# Patient Record
Sex: FEMALE | Race: WHITE | Employment: FULL TIME | URBAN - METROPOLITAN AREA
[De-identification: names, ages, dates, MRNs, and addresses within clinical notes are randomized per-mention and may not be internally consistent; named-entity substitution may affect disease eponyms.]

---

## 2018-01-02 PROBLEM — F41.8 DEPRESSION WITH ANXIETY: Status: ACTIVE | Noted: 2018-01-02

## 2018-02-28 PROBLEM — E03.9 PRIMARY HYPOTHYROIDISM: Status: ACTIVE | Noted: 2018-02-28

## 2018-09-06 ENCOUNTER — HOSPITAL ENCOUNTER (OUTPATIENT)
Dept: GENERAL RADIOLOGY | Age: 53
Discharge: HOME | End: 2018-09-06
Payer: COMMERCIAL

## 2018-09-06 DIAGNOSIS — R05.9 COUGH: ICD-10-CM

## 2018-09-06 PROCEDURE — 71046 X-RAY EXAM CHEST 2 VIEWS: CPT

## 2018-09-21 ENCOUNTER — RX ONLY (OUTPATIENT)
Age: 53
Setting detail: RX ONLY
End: 2018-09-21

## 2018-10-15 PROBLEM — R05.9 COUGH: Status: RESOLVED | Noted: 2018-09-06 | Resolved: 2018-10-15

## 2018-11-02 ENCOUNTER — RX ONLY (OUTPATIENT)
Age: 53
Setting detail: RX ONLY
End: 2018-11-02

## 2018-11-26 ENCOUNTER — RX ONLY (OUTPATIENT)
Age: 53
Setting detail: RX ONLY
End: 2018-11-26

## 2019-05-29 ENCOUNTER — APPOINTMENT (RX ONLY)
Dept: URBAN - METROPOLITAN AREA CLINIC 349 | Facility: CLINIC | Age: 54
Setting detail: DERMATOLOGY
End: 2019-05-29

## 2019-05-29 DIAGNOSIS — Z71.89 OTHER SPECIFIED COUNSELING: ICD-10-CM

## 2019-05-29 DIAGNOSIS — L72.0 EPIDERMAL CYST: ICD-10-CM

## 2019-05-29 DIAGNOSIS — L30.9 DERMATITIS, UNSPECIFIED: ICD-10-CM

## 2019-05-29 DIAGNOSIS — L82.1 OTHER SEBORRHEIC KERATOSIS: ICD-10-CM

## 2019-05-29 DIAGNOSIS — L84 CORNS AND CALLOSITIES: ICD-10-CM

## 2019-05-29 DIAGNOSIS — L82.0 INFLAMED SEBORRHEIC KERATOSIS: ICD-10-CM

## 2019-05-29 DIAGNOSIS — D22 MELANOCYTIC NEVI: ICD-10-CM

## 2019-05-29 PROBLEM — J30.1 ALLERGIC RHINITIS DUE TO POLLEN: Status: ACTIVE | Noted: 2019-05-29

## 2019-05-29 PROBLEM — D22.39 MELANOCYTIC NEVI OF OTHER PARTS OF FACE: Status: ACTIVE | Noted: 2019-05-29

## 2019-05-29 PROBLEM — D22.5 MELANOCYTIC NEVI OF TRUNK: Status: ACTIVE | Noted: 2019-05-29

## 2019-05-29 PROBLEM — D22.62 MELANOCYTIC NEVI OF LEFT UPPER LIMB, INCLUDING SHOULDER: Status: ACTIVE | Noted: 2019-05-29

## 2019-05-29 PROBLEM — E78.1 HYPERTRIGLYCERIDEMIA: Status: ACTIVE | Noted: 2019-05-29

## 2019-05-29 PROCEDURE — ? LIQUID NITROGEN

## 2019-05-29 PROCEDURE — ? COUNSELING

## 2019-05-29 PROCEDURE — 17110 DESTRUCTION B9 LES UP TO 14: CPT

## 2019-05-29 PROCEDURE — ? OTHER

## 2019-05-29 PROCEDURE — ? OBSERVATION

## 2019-05-29 PROCEDURE — 99242 OFF/OP CONSLTJ NEW/EST SF 20: CPT | Mod: 25

## 2019-05-29 ASSESSMENT — LOCATION SIMPLE DESCRIPTION DERM
LOCATION SIMPLE: LEFT ANTERIOR NECK
LOCATION SIMPLE: CHEST
LOCATION SIMPLE: RIGHT BUTTOCK
LOCATION SIMPLE: ABDOMEN
LOCATION SIMPLE: RIGHT FOREHEAD
LOCATION SIMPLE: LEFT FOREARM
LOCATION SIMPLE: LEFT UPPER BACK
LOCATION SIMPLE: LEFT HAND
LOCATION SIMPLE: LEFT LOWER BACK
LOCATION SIMPLE: RIGHT CHEEK
LOCATION SIMPLE: RIGHT 2ND TOE
LOCATION SIMPLE: SUPERIOR FOREHEAD
LOCATION SIMPLE: RIGHT INFERIOR EYELID

## 2019-05-29 ASSESSMENT — LOCATION DETAILED DESCRIPTION DERM
LOCATION DETAILED: RIGHT SUPERIOR MEDIAL MALAR CHEEK
LOCATION DETAILED: RIGHT LATERAL INFERIOR EYELID
LOCATION DETAILED: SUPERIOR MID FOREHEAD
LOCATION DETAILED: LEFT CLAVICULAR NECK
LOCATION DETAILED: LEFT SUPERIOR LATERAL UPPER BACK
LOCATION DETAILED: LEFT VENTRAL PROXIMAL FOREARM
LOCATION DETAILED: RIGHT SUPERIOR FOREHEAD
LOCATION DETAILED: LEFT SUPERIOR MEDIAL MIDBACK
LOCATION DETAILED: LEFT RADIAL DORSAL HAND
LOCATION DETAILED: RIGHT DORSAL 2ND TOE
LOCATION DETAILED: SUBXIPHOID
LOCATION DETAILED: LEFT LATERAL SUPERIOR CHEST
LOCATION DETAILED: RIGHT INFERIOR MEDIAL MALAR CHEEK
LOCATION DETAILED: RIGHT BUTTOCK

## 2019-05-29 ASSESSMENT — LOCATION ZONE DERM
LOCATION ZONE: NECK
LOCATION ZONE: TOE
LOCATION ZONE: FACE
LOCATION ZONE: TRUNK
LOCATION ZONE: HAND
LOCATION ZONE: ARM
LOCATION ZONE: EYELID

## 2019-05-29 NOTE — HPI: SKIN LESIONS
How Severe Is Your Skin Lesion?: mild
Have Your Skin Lesions Been Treated?: not been treated
Is This A New Presentation, Or A Follow-Up?: Skin Lesions
Additional History: Pt is here to have a skin check done, pt denies any personal or family history of melanoma or any other skin cancers. Pt also stated she has some growths that she thinks are warts that she would like to have looked at and treated if possible.

## 2019-05-29 NOTE — PROCEDURE: OTHER
Other (Free Text): Suggested pt buy mediplast and apply to the area daily and use an [place] board to remove dead skin.\\n\\nHas lesion on other foot that’s similiar. Other (Free Text): Suggested pt buy mediplast and apply to the area daily and use an [place] board to remove dead skin.\\n\\nHas lesion on other foot that’s similiar.

## 2019-05-29 NOTE — PROCEDURE: OTHER
Other (Free Text): Stated to pt that some of the larger ones might take more than one freeze. Pt expressed understanding

## 2019-05-29 NOTE — PROCEDURE: OTHER
Other (Free Text): Stated to pt that they can be extracted if they are bothersome. Declines treatment today. Quoted $20 to treat all at next appointment if she’d like

## 2019-05-29 NOTE — PROCEDURE: OTHER
Other (Free Text): Stated those can be treated with LN2. Mentioned to pt that they sometimes look worse better they get better and there is always a risk of scarring or discoloration. Pt expressed understanding.

## 2019-05-29 NOTE — PROCEDURE: OTHER
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 \\n\\nNo rash was present today and advised pt to contact office if rash recurs and we will bring her in and do a biopsy. Pt expressed understanding.

## 2019-05-29 NOTE — PROCEDURE: LIQUID NITROGEN
Render Note In Bullet Format When Appropriate: No
Duration Of Freeze Thaw-Cycle (Seconds): 3
Medical Necessity Clause: This procedure was medically necessary because the lesions that were treated were:
Consent: The patient's consent was obtained including but not limited to risks of crusting, scabbing, blistering, scarring, darker or lighter pigmentary change, recurrence, incomplete removal and infection.
Post-Care Instructions: I reviewed with the patient in detail post-care instructions. Patient is to wear sunprotection, and avoid picking at any of the treated lesions. Pt may apply Vaseline to crusted or scabbing areas.
Medical Necessity Information: It is in your best interest to select a reason for this procedure from the list below. All of these items fulfill various CMS LCD requirements except the new and changing color options.
Detail Level: Detailed

## 2019-07-02 ENCOUNTER — APPOINTMENT (RX ONLY)
Dept: URBAN - METROPOLITAN AREA CLINIC 349 | Facility: CLINIC | Age: 54
Setting detail: DERMATOLOGY
End: 2019-07-02

## 2019-07-02 DIAGNOSIS — L98.8 OTHER SPECIFIED DISORDERS OF THE SKIN AND SUBCUTANEOUS TISSUE: ICD-10-CM

## 2019-07-02 DIAGNOSIS — L82.0 INFLAMED SEBORRHEIC KERATOSIS: ICD-10-CM | Status: RESOLVED

## 2019-07-02 PROCEDURE — ? COUNSELING

## 2019-07-02 PROCEDURE — 99213 OFFICE O/P EST LOW 20 MIN: CPT

## 2019-07-02 PROCEDURE — ? MEDICAL CONSULTATION: DYNAMIC RHYTIDES

## 2019-07-02 PROCEDURE — ? OTHER

## 2019-07-02 ASSESSMENT — LOCATION ZONE DERM: LOCATION ZONE: FACE

## 2019-07-02 ASSESSMENT — LOCATION DETAILED DESCRIPTION DERM
LOCATION DETAILED: SUPERIOR MID FOREHEAD
LOCATION DETAILED: RIGHT MEDIAL FOREHEAD

## 2019-07-02 ASSESSMENT — LOCATION SIMPLE DESCRIPTION DERM
LOCATION SIMPLE: RIGHT FOREHEAD
LOCATION SIMPLE: SUPERIOR FOREHEAD

## 2019-07-02 NOTE — PROCEDURE: OTHER
Detail Level: Zone
Other (Free Text): Pt had a couple of lesions treated with LN2. Pt stated she was surprised by the discoloration and the scar it left behind. Pt is unsure if the one on her back has resolved. \\n\\nLesion on patient’s back was observed and appears to have resolved. Indy discussed with pt that it has only been six weeks since her SKs were treated. Advised pt to keep areas covered with sunscreen when out in the sun and they the discoloration should fade with time. Pt expressed understanding.
Detail Level: Detailed
Other (Free Text): Pt inquired about what treatment options are out there to help with fine lines and helping with wrinkles. Pt had pointed out the lines in her forehead and around her eyes. Indy stated she could always try RetinA to help try to smooth those lines but to really achieve the look that pt desires, she would need Botox. Indy stated those lines in her forehead and around her eyes are from movement. Pt expressed understanding.
Note Text (......Xxx Chief Complaint.): This diagnosis correlates with the

## 2022-03-19 PROBLEM — E78.1 HYPERTRIGLYCERIDEMIA: Status: ACTIVE | Noted: 2019-05-29

## 2022-03-19 PROBLEM — E03.9 PRIMARY HYPOTHYROIDISM: Status: ACTIVE | Noted: 2018-02-28

## 2022-03-20 PROBLEM — F41.8 DEPRESSION WITH ANXIETY: Status: ACTIVE | Noted: 2018-01-02

## 2022-05-26 DIAGNOSIS — E21.0 PRIMARY HYPERPARATHYROIDISM (HCC): ICD-10-CM

## 2022-05-26 DIAGNOSIS — E03.9 PRIMARY HYPOTHYROIDISM: Primary | ICD-10-CM

## 2022-05-26 DIAGNOSIS — E55.9 VITAMIN D DEFICIENCY: ICD-10-CM

## 2022-06-08 ENCOUNTER — OFFICE VISIT (OUTPATIENT)
Dept: ENDOCRINOLOGY | Age: 57
End: 2022-06-08
Payer: COMMERCIAL

## 2022-06-08 VITALS
OXYGEN SATURATION: 97 % | DIASTOLIC BLOOD PRESSURE: 86 MMHG | WEIGHT: 136 LBS | SYSTOLIC BLOOD PRESSURE: 124 MMHG | HEART RATE: 83 BPM

## 2022-06-08 DIAGNOSIS — E03.9 PRIMARY HYPOTHYROIDISM: Primary | ICD-10-CM

## 2022-06-08 DIAGNOSIS — E55.9 VITAMIN D DEFICIENCY: ICD-10-CM

## 2022-06-08 DIAGNOSIS — Z86.39 HISTORY OF PRIMARY HYPERPARATHYROIDISM: ICD-10-CM

## 2022-06-08 PROCEDURE — 99214 OFFICE O/P EST MOD 30 MIN: CPT | Performed by: INTERNAL MEDICINE

## 2022-06-08 RX ORDER — METFORMIN HYDROCHLORIDE 500 MG/1
500 TABLET, EXTENDED RELEASE ORAL DAILY
Qty: 90 TABLET | Refills: 3 | Status: SHIPPED | OUTPATIENT
Start: 2022-06-08

## 2022-06-08 RX ORDER — LEVOTHYROXINE SODIUM 0.07 MG/1
TABLET ORAL
Qty: 77 TABLET | Refills: 3 | Status: SHIPPED | OUTPATIENT
Start: 2022-06-08

## 2022-06-08 RX ORDER — LEVOTHYROXINE SODIUM 0.07 MG/1
TABLET ORAL
Qty: 77 TABLET | Refills: 3 | Status: SHIPPED | OUTPATIENT
Start: 2022-06-08 | End: 2022-06-08 | Stop reason: SDUPTHER

## 2022-06-08 ASSESSMENT — ENCOUNTER SYMPTOMS
DIARRHEA: 0
CONSTIPATION: 1

## 2022-06-08 NOTE — PROGRESS NOTES
Emily Durand MD, [address]            Reason for visit: Follow-up of hypothyroidism      ASSESSMENT AND PLAN:    1. Primary hypothyroidism  She is biochemically euthyroid. Continue levothyroxine as prescribed. - TSH; Future  - T4, Free; Future  - levothyroxine (SYNTHROID) 75 MCG tablet; 1 tablet daily 6 days a week (skip one day per week)  Dispense: 77 tablet; Refill: 3    2. Vitamin D deficiency  Vitamin D is replete. Continue over-the-counter vitamin D replacement. - Vitamin D 25 Hydroxy; Future    3. History of primary hyperparathyroidism  Calcium remains normal.  - Comprehensive Metabolic Panel; Future        Follow-up and Dispositions    · Return in about 6 months (around 12/8/2022). History of Present Illness:    Marissa Abdi presents to the office for follow up of primary hypothyroidism and primary hyperparathyroidism (status post parathyroidectomy in 2014). HYPOTHYROIDISM  Presentation of hypothyroidism: She was diagnosed with hypothyroidism in 2009. She was previously under the care of Dr. Marino Friend (endocrinologist) in [place]. Treatment status: She was started on Synthroid at the time of diagnosis. She was changed to [place] Thyroid ~2010. Her dose was decreased from 90 mg daily to 60 mg daily early 2012. Her dose was decreased further to 60 mg daily six days per week 8/30/2012. It was increased back to 60 mg every day 11/30/2012. I changed her to Synthroid 100 mcg daily 5/24/2013. On 8/10/2013, her insurance company required her to change to generic levothyroxine 100 mcg.  Her dose was changed to 100 mcg daily 6 days per week 8/27/2013, to 75 mcg daily 10/15/2013, to 75 mcg daily six days per week 10/21/2014, and to 75 mcg daily seven days per week 2/24/2017.   -75 mcg daily 6 days/week and 37.5 mcg daily 1 day/week 6/5/2019  -75 mcg daily ~July 2019 (change made on her own)  -75 mcg daily 6 days/week 11/18/2022    Symptoms: See review of systems below.     Menses: LMP was ~2014. Labs:  4/6/2012: TSH 0.74.   8/28/2012: TSH 0.270, free T4 0.8, T3 159, antithyroid peroxidase antibodies less than 10 (negative). 11/27/2012: TSH 4.930, free T4 0.56.   5/20/2013: TSH 0.725, free T4 0.67, T3 150.  8/22/2013: TSH 0.029.  10/10/2013: TSH 0.063.   12/24/2013: TSH 0.553.  4/17/2014: TSH 0.624.  10/15/2014: TSH 0.176.  2/20/2015: TSH 2.004.   8/20/2015: TSH 0.968.  2/22/2016: TSH 0.275.  6/22/2016: TSH 1.234.  2/15/2017:  TSH 3.230.  5/24/2017: TSH 0.423, free T4 1.37.  8/4/2017: TSH 1.632.  8/23/2017: TSH 0.692, free T4 1.15.  2/26/2018:  TSH 0.650, free T4 1.24.  12/28/2018: TSH 1.660, free T4 1.17, total cholesterol 243, triglycerides 166, HDL 62, , glucose 76.  6/4/2019: TSH 0.188, free T4 1.51.  12/12/2019: TSH 0.760, free T4 1.04.  6/1/2020: TSH 0.476, free T4 1.03.  8/19/2020: TSH 0.685.  5/25/2021: TSH 0.578, free T4 1.2.  11/12/2021: TSH 0.212, free T4 0.94.  6/1/2022: TSH 0.529, free T4 0.91.       HYPERCALCEMIA   Presentation of hypercalcemia: asymptomatic, with routine lab screen. Associated conditions: She denies known osteoporosis, fragility fractures, or nephrolithiasis.      Labs:  8/26/2014: Calcium 10.3, creatinine 0.84.  9/4/2014: Calcium 10.4, intact parathyroid hormone 25.  10/15/2014: Calcium 9.8, ionized calcium 5.4 (reference 4.1 4.9), creatinine 0.77, intact parathyroid hormone 36.  10/21/2014: Urine calcium to creatinine ratio 0.22.  11/20/2014: Ionized calcium 5.1 (reference 4.1-4.9), intact parathyroid hormone 96.  2/20/2015: Calcium 9.4, creatinine 0.86.   2/22/2016: Calcium 9.4, creatinine 0.79.  6/22/2016: Calcium 9.3, creatinine 0.86.  2/15/2017:  Calcium 9.4, creatinine 0.73.  8/4/2017:  Calcium 10.3, creatinine 1.02.  1/10/2018: Calcium 9.4, 25 hydroxy vitamin D 55.1.  2/26/2018:  Calcium 8.9.  12/28/2018: Calcium 9.4.  6/1/2020: Calcium 9.1.  8/19/2020: Calcium 9.1, 25 hydroxy vitamin D 53.1.  2021: Calcium 9.2, 25 hydroxy vitamin D 68.2.  2021: Calcium 9.7, 25-hydroxy vitamin D 69.8.  2022: Calcium 8.9, 25-hydroxy vitamin D 64.7. Bone densitometry:  2014: DXA ([facility])- normal bone density. Imagin2014: CT neck ([facility])- probable left inferior parathyroid adenoma. Prior treatment: Subtotal parathyroidectomy 2014.     Review of Systems   Constitutional: Positive for fatigue (mild; she attributes to lack of sleep) and unexpected weight change (gained ~10 pounds in the last 3 months). Positive for hair loss. Cardiovascular: Negative for palpitations. Gastrointestinal: Positive for constipation (chronic). Negative for diarrhea. Neurological: Negative for tremors. Psychiatric/Behavioral: Positive for sleep disturbance. /86 (Site: Left Upper Arm, Position: Sitting)   Pulse 83   Wt 136 lb (61.7 kg)   SpO2 97%   Wt Readings from Last 3 Encounters:   22 136 lb (61.7 kg)       Physical Exam  HENT:      Head: Normocephalic. Neck:      Thyroid: No thyroid mass or thyromegaly. Comments: Healed parathyroidectomy scar. No palpable neck masses. Cardiovascular:      Rate and Rhythm: Normal rate. Pulmonary:      Effort: No respiratory distress. Breath sounds: Normal breath sounds. Neurological:      Mental Status: She is alert.    Psychiatric:         Mood and Affect: Mood normal.         Behavior: Behavior normal.         Orders Placed This Encounter   Procedures    TSH     Standing Status:   Future     Standing Expiration Date:   2023    T4, Free     Standing Status:   Future     Standing Expiration Date:   2023    Comprehensive Metabolic Panel     Standing Status:   Future     Standing Expiration Date:   2023    Vitamin D 25 Hydroxy     Standing Status:   Future     Standing Expiration Date:   2023       Current Outpatient Medications   Medication Sig Dispense Refill    levothyroxine (SYNTHROID) 75 MCG tablet 1 tablet daily 6 days a week (skip one day per week) 77 tablet 3    metFORMIN (GLUCOPHAGE-XR) 500 mg extended release tablet Take 1 tablet by mouth daily 90 tablet 3    Calcium Carbonate-Vitamin D (CALCIUM-VITAMIN D3 PO) Take by mouth daily      LYSINE PO Take by mouth daily      ALPRAZolam (XANAX) 0.5 MG tablet 1 every 12 hours prn      buPROPion (WELLBUTRIN XL) 300 MG extended release tablet Take 300 mg by mouth      clobetasol (TEMOVATE) 0.05 % ointment Use twice daily with flare. Use once weekly without flare.  eletriptan (RELPAX) 40 MG tablet Take 40 mg by mouth daily as needed      nitrofurantoin, macrocrystal-monohydrate, (MACROBID) 100 MG capsule Take 100 mg by mouth as needed      rosuvastatin (CRESTOR) 10 MG tablet Take 10 mg by mouth      spironolactone (ALDACTONE) 100 MG tablet TAKE 1 TABLET BY MOUTH EVERY DAY      triamcinolone (KENALOG) 0.1 % cream Apply topically 2 times daily       No current facility-administered medications for this visit. Raghu Odom MD, FACE      Portions of this note were generated with the assistance of voice recognition software. As such, some errors in transcription may be present.

## 2022-12-10 LAB
25(OH)D3+25(OH)D2 SERPL-MCNC: 67.6 NG/ML (ref 30–100)
ALBUMIN SERPL-MCNC: 4.8 G/DL (ref 3.8–4.9)
ALBUMIN/GLOB SERPL: 2.5 {RATIO} (ref 1.2–2.2)
ALP SERPL-CCNC: 76 IU/L (ref 44–121)
ALT SERPL-CCNC: 25 IU/L (ref 0–32)
AST SERPL-CCNC: 23 IU/L (ref 0–40)
BILIRUB SERPL-MCNC: 0.4 MG/DL (ref 0–1.2)
BUN SERPL-MCNC: 17 MG/DL (ref 6–24)
BUN/CREAT SERPL: 19 (ref 9–23)
CALCIUM SERPL-MCNC: 9.1 MG/DL (ref 8.7–10.2)
CHLORIDE SERPL-SCNC: 101 MMOL/L (ref 96–106)
CO2 SERPL-SCNC: 21 MMOL/L (ref 20–29)
CREAT SERPL-MCNC: 0.88 MG/DL (ref 0.57–1)
EGFR: 77 ML/MIN/1.73
GLOBULIN SER CALC-MCNC: 1.9 G/DL (ref 1.5–4.5)
GLUCOSE SERPL-MCNC: 102 MG/DL (ref 70–99)
POTASSIUM SERPL-SCNC: 4.1 MMOL/L (ref 3.5–5.2)
PROT SERPL-MCNC: 6.7 G/DL (ref 6–8.5)
SODIUM SERPL-SCNC: 140 MMOL/L (ref 134–144)
T4 FREE SERPL-MCNC: 1.14 NG/DL (ref 0.82–1.77)
TSH SERPL DL<=0.005 MIU/L-ACNC: 1.45 UIU/ML (ref 0.45–4.5)

## 2022-12-13 ENCOUNTER — OFFICE VISIT (OUTPATIENT)
Dept: ENDOCRINOLOGY | Age: 57
End: 2022-12-13
Payer: COMMERCIAL

## 2022-12-13 VITALS
SYSTOLIC BLOOD PRESSURE: 111 MMHG | OXYGEN SATURATION: 98 % | BODY MASS INDEX: 25.86 KG/M2 | HEIGHT: 61 IN | HEART RATE: 80 BPM | RESPIRATION RATE: 16 BRPM | WEIGHT: 137 LBS | DIASTOLIC BLOOD PRESSURE: 90 MMHG

## 2022-12-13 DIAGNOSIS — E03.9 PRIMARY HYPOTHYROIDISM: Primary | ICD-10-CM

## 2022-12-13 DIAGNOSIS — Z86.39 HISTORY OF PRIMARY HYPERPARATHYROIDISM: ICD-10-CM

## 2022-12-13 DIAGNOSIS — E55.9 VITAMIN D DEFICIENCY: ICD-10-CM

## 2022-12-13 PROCEDURE — 99214 OFFICE O/P EST MOD 30 MIN: CPT | Performed by: INTERNAL MEDICINE

## 2022-12-13 RX ORDER — METFORMIN HYDROCHLORIDE 500 MG/1
500 TABLET, EXTENDED RELEASE ORAL DAILY
Qty: 90 TABLET | Refills: 3 | Status: SHIPPED | OUTPATIENT
Start: 2022-12-13

## 2022-12-13 RX ORDER — OMEPRAZOLE 20 MG/1
CAPSULE, DELAYED RELEASE ORAL
COMMUNITY
Start: 2022-10-28

## 2022-12-13 RX ORDER — VALACYCLOVIR HYDROCHLORIDE 1 G/1
TABLET, FILM COATED ORAL
COMMUNITY
Start: 2022-10-28

## 2022-12-13 RX ORDER — LEVOTHYROXINE SODIUM 0.07 MG/1
TABLET ORAL
Qty: 77 TABLET | Refills: 3 | Status: SHIPPED | OUTPATIENT
Start: 2022-12-13

## 2022-12-13 ASSESSMENT — ENCOUNTER SYMPTOMS
CONSTIPATION: 1
DIARRHEA: 0
NAUSEA: 1

## 2022-12-13 NOTE — PROGRESS NOTES
Shannan Robles MD, [address]            Reason for visit: Follow-up of hypothyroidism      ASSESSMENT AND PLAN:    1. Primary hypothyroidism  She is biochemically euthyroid. Continue levothyroxine as prescribed. - TSH; Future  - T4, Free; Future  - levothyroxine (SYNTHROID) 75 MCG tablet; 1 tablet daily 6 days a week (skip one day per week)  Dispense: 77 tablet; Refill: 3    2. Vitamin D deficiency  Vitamin D is replete. Continue over-the-counter vitamin D replacement. - Vitamin D 25 Hydroxy; Future    3. History of primary hyperparathyroidism  Calcium remains normal.  - Comprehensive Metabolic Panel; Future        Follow-up and Dispositions    Return in about 6 months (around 6/13/2023). History of Present Illness:    Ld Chris presents to the office for follow up of primary hypothyroidism and primary hyperparathyroidism (status post parathyroidectomy in 2014). HYPOTHYROIDISM  Presentation of hypothyroidism: She was diagnosed with hypothyroidism in 2009. She was previously under the care of Dr. Cayden Tom (endocrinologist) in [place]. Treatment status: She was started on Synthroid at the time of diagnosis. She was changed to [place] Thyroid ~2010. Her dose was decreased from 90 mg daily to 60 mg daily early 2012. Her dose was decreased further to 60 mg daily six days per week 8/30/2012. It was increased back to 60 mg every day 11/30/2012. I changed her to Synthroid 100 mcg daily 5/24/2013. On 8/10/2013, her insurance company required her to change to generic levothyroxine 100 mcg.  Her dose was changed to 100 mcg daily 6 days per week 8/27/2013, to 75 mcg daily 10/15/2013, to 75 mcg daily six days per week 10/21/2014, and to 75 mcg daily seven days per week 2/24/2017.   -75 mcg daily 6 days/week and 37.5 mcg daily 1 day/week 6/5/2019  -75 mcg daily ~July 2019 (change made on her own)  -75 mcg daily 6 days/week 11/18/2022    Symptoms: See review of systems below. Menses: LMP was ~2014. Labs:  4/6/2012: TSH 0.74.   8/28/2012: TSH 0.270, free T4 0.8, T3 159, antithyroid peroxidase antibodies less than 10 (negative). 11/27/2012: TSH 4.930, free T4 0.56.   5/20/2013: TSH 0.725, free T4 0.67, T3 150.  8/22/2013: TSH 0.029.  10/10/2013: TSH 0.063.   12/24/2013: TSH 0.553.  4/17/2014: TSH 0.624.  10/15/2014: TSH 0.176.  2/20/2015: TSH 2.004.   8/20/2015: TSH 0.968.  2/22/2016: TSH 0.275.  6/22/2016: TSH 1.234.  2/15/2017:  TSH 3.230.  5/24/2017: TSH 0.423, free T4 1.37.  8/4/2017: TSH 1.632.  8/23/2017: TSH 0.692, free T4 1.15.  2/26/2018:  TSH 0.650, free T4 1.24.  12/28/2018: TSH 1.660, free T4 1.17, total cholesterol 243, triglycerides 166, HDL 62, , glucose 76.  6/4/2019: TSH 0.188, free T4 1.51.  12/12/2019: TSH 0.760, free T4 1.04.  6/1/2020: TSH 0.476, free T4 1.03.  8/19/2020: TSH 0.685.  5/25/2021: TSH 0.578, free T4 1.2.  11/12/2021: TSH 0.212, free T4 0.94.  6/1/2022: TSH 0.529, free T4 0.91.  12/9/2022: TSH 1.450, free T4 1. 14. HYPERCALCEMIA   Presentation of hypercalcemia: asymptomatic, with routine lab screen. Associated conditions: No history of osteoporosis, fragility fractures, or nephrolithiasis.      Labs:  8/26/2014: Calcium 10.3, creatinine 0.84.  9/4/2014: Calcium 10.4, intact parathyroid hormone 25.  10/15/2014: Calcium 9.8, ionized calcium 5.4 (reference 4.1 4.9), creatinine 0.77, intact parathyroid hormone 36.  10/21/2014: Urine calcium to creatinine ratio 0.22.  11/20/2014: Ionized calcium 5.1 (reference 4.1-4.9), intact parathyroid hormone 96.  2/20/2015: Calcium 9.4, creatinine 0.86.   2/22/2016: Calcium 9.4, creatinine 0.79.  6/22/2016: Calcium 9.3, creatinine 0.86.  2/15/2017:  Calcium 9.4, creatinine 0.73.  8/4/2017:  Calcium 10.3, creatinine 1.02.  1/10/2018: Calcium 9.4, 25 hydroxy vitamin D 55.1.  2/26/2018:  Calcium 8.9.  12/28/2018: Calcium 9.4.  6/1/2020: Calcium 9.1.  8/19/2020: Calcium 9.1, 25 hydroxy vitamin D 53.1.  2021: Calcium 9.2, 25 hydroxy vitamin D 68.2.  2021: Calcium 9.7, 25-hydroxy vitamin D 69.8.  2022: Calcium 8.9, 25-hydroxy vitamin D 64.7.  2022: Calcium 9.81, 25-hydroxy vitamin D 67.6. Bone densitometry:  2014: DXA ([facility])- normal bone density. Imagin2014: CT neck ([facility])- probable left inferior parathyroid adenoma. Prior treatment: Subtotal parathyroidectomy 2014. Review of Systems   Constitutional:  Positive for fatigue (mild; she attributes to lack of sleep). Negative for unexpected weight change (stable lately). Positive for hair loss. Cardiovascular:  Negative for palpitations. Gastrointestinal:  Positive for constipation (chronic) and nausea. Negative for diarrhea. Neurological:  Positive for dizziness and weakness. Negative for tremors. Psychiatric/Behavioral:  Positive for sleep disturbance. BP (!) 111/90   Pulse 80   Resp 16   Ht 5' 1\" (1.549 m)   Wt 137 lb (62.1 kg)   LMP  (Approximate)   SpO2 98%   BMI 25.89 kg/m²   Wt Readings from Last 3 Encounters:   22 137 lb (62.1 kg)   22 136 lb (61.7 kg)       Physical Exam  HENT:      Head: Normocephalic. Neck:      Thyroid: No thyroid mass or thyromegaly. Comments: Healed parathyroidectomy scar. No palpable neck masses. Cardiovascular:      Rate and Rhythm: Normal rate. Pulmonary:      Effort: No respiratory distress. Breath sounds: Normal breath sounds. Neurological:      Mental Status: She is alert.    Psychiatric:         Mood and Affect: Mood normal.         Behavior: Behavior normal.       Orders Placed This Encounter   Procedures    TSH     Standing Status:   Future     Standing Expiration Date:   2023    T4, Free     Standing Status:   Future     Standing Expiration Date:   2023    Vitamin D 25 Hydroxy     Standing Status:   Future     Standing Expiration Date:   2023    Comprehensive Metabolic Panel Standing Status:   Future     Standing Expiration Date:   12/13/2023         Current Outpatient Medications   Medication Sig Dispense Refill    omeprazole (PRILOSEC) 20 MG delayed release capsule TAKE ONE CAPSULE BY MOUTH ONE TIME DAILY      valACYclovir (VALTREX) 1 g tablet       levothyroxine (SYNTHROID) 75 MCG tablet 1 tablet daily 6 days a week (skip one day per week) 77 tablet 3    metFORMIN (GLUCOPHAGE-XR) 500 MG extended release tablet Take 1 tablet by mouth daily 90 tablet 3    Calcium Carbonate-Vitamin D (CALCIUM-VITAMIN D3 PO) Take by mouth daily      LYSINE PO Take by mouth daily      ALPRAZolam (XANAX) 0.5 MG tablet 1 every 12 hours prn      buPROPion (WELLBUTRIN XL) 300 MG extended release tablet Take 300 mg by mouth      clobetasol (TEMOVATE) 0.05 % ointment Use twice daily with flare. Use once weekly without flare. eletriptan (RELPAX) 40 MG tablet Take 40 mg by mouth daily as needed      nitrofurantoin, macrocrystal-monohydrate, (MACROBID) 100 MG capsule Take 100 mg by mouth as needed      rosuvastatin (CRESTOR) 10 MG tablet Take 10 mg by mouth      spironolactone (ALDACTONE) 100 MG tablet TAKE 1 TABLET BY MOUTH EVERY DAY      triamcinolone (KENALOG) 0.1 % cream Apply topically 2 times daily       No current facility-administered medications for this visit. Grisel Adamson MD, FACE      Portions of this note were generated with the assistance of voice recognition software. As such, some errors in transcription may be present.

## 2023-08-03 RX ORDER — METFORMIN HYDROCHLORIDE 500 MG/1
TABLET, EXTENDED RELEASE ORAL
Qty: 30 TABLET | Refills: 0 | OUTPATIENT
Start: 2023-08-03

## 2024-01-11 ENCOUNTER — APPOINTMENT (RX ONLY)
Dept: URBAN - METROPOLITAN AREA CLINIC 329 | Facility: CLINIC | Age: 59
Setting detail: DERMATOLOGY
End: 2024-01-11

## 2024-01-11 DIAGNOSIS — L82.1 OTHER SEBORRHEIC KERATOSIS: ICD-10-CM | Status: STABLE

## 2024-01-11 DIAGNOSIS — L82.0 INFLAMED SEBORRHEIC KERATOSIS: ICD-10-CM

## 2024-01-11 DIAGNOSIS — L81.4 OTHER MELANIN HYPERPIGMENTATION: ICD-10-CM | Status: STABLE

## 2024-01-11 PROCEDURE — 11311 SHAVE SKIN LESION 0.6-1.0 CM: CPT

## 2024-01-11 PROCEDURE — ? ADDITIONAL NOTES

## 2024-01-11 PROCEDURE — ? SHAVE REMOVAL

## 2024-01-11 PROCEDURE — ? COUNSELING

## 2024-01-11 PROCEDURE — 99203 OFFICE O/P NEW LOW 30 MIN: CPT | Mod: 25

## 2024-01-11 ASSESSMENT — LOCATION ZONE DERM
LOCATION ZONE: FACE
LOCATION ZONE: TRUNK

## 2024-01-11 ASSESSMENT — LOCATION DETAILED DESCRIPTION DERM
LOCATION DETAILED: RIGHT MEDIAL FOREHEAD
LOCATION DETAILED: LEFT CLAVICULAR SKIN
LOCATION DETAILED: UPPER STERNUM
LOCATION DETAILED: RIGHT SUPERIOR FOREHEAD
LOCATION DETAILED: RIGHT SUPERIOR MEDIAL FOREHEAD

## 2024-01-11 ASSESSMENT — LOCATION SIMPLE DESCRIPTION DERM
LOCATION SIMPLE: LEFT CLAVICULAR SKIN
LOCATION SIMPLE: RIGHT FOREHEAD
LOCATION SIMPLE: CHEST

## 2024-01-11 NOTE — HPI: SKIN LESIONS
How Severe Is Your Skin Lesion?: moderate
Is This A New Presentation, Or A Follow-Up?: Skin Lesions
Additional History: Pt complains of lesions on her forehead and her chest/shoulder area. She has been using cortisone on the lesion on her forehead and she states it has gotten smaller.

## 2024-01-11 NOTE — PROCEDURE: ADDITIONAL NOTES
Render Risk Assessment In Note?: no
Detail Level: Simple
Additional Notes: Pt will let us know when she is tired of the lesions on her chest. We will then shave remove them for her.

## 2024-06-03 ENCOUNTER — OFFICE VISIT (OUTPATIENT)
Dept: ENDOCRINOLOGY | Age: 59
End: 2024-06-03
Payer: COMMERCIAL

## 2024-06-03 VITALS
HEART RATE: 93 BPM | SYSTOLIC BLOOD PRESSURE: 118 MMHG | BODY MASS INDEX: 25.13 KG/M2 | DIASTOLIC BLOOD PRESSURE: 74 MMHG | WEIGHT: 133 LBS

## 2024-06-03 DIAGNOSIS — E03.9 PRIMARY HYPOTHYROIDISM: Primary | ICD-10-CM

## 2024-06-03 DIAGNOSIS — F41.9 ANXIETY AND DEPRESSION: ICD-10-CM

## 2024-06-03 DIAGNOSIS — E55.9 VITAMIN D DEFICIENCY: ICD-10-CM

## 2024-06-03 DIAGNOSIS — Z86.39 HISTORY OF PRIMARY HYPERPARATHYROIDISM: ICD-10-CM

## 2024-06-03 DIAGNOSIS — E03.9 PRIMARY HYPOTHYROIDISM: ICD-10-CM

## 2024-06-03 DIAGNOSIS — F32.A ANXIETY AND DEPRESSION: ICD-10-CM

## 2024-06-03 LAB
25(OH)D3 SERPL-MCNC: 47.9 NG/ML (ref 30–100)
ALBUMIN SERPL-MCNC: 4.6 G/DL (ref 3.5–5)
ALBUMIN/GLOB SERPL: 1.7 (ref 1–1.9)
ALP SERPL-CCNC: 66 U/L (ref 35–104)
ALT SERPL-CCNC: 29 U/L (ref 12–65)
ANION GAP SERPL CALC-SCNC: 11 MMOL/L (ref 9–18)
AST SERPL-CCNC: 26 U/L (ref 15–37)
BILIRUB SERPL-MCNC: 0.8 MG/DL (ref 0–1.2)
BUN SERPL-MCNC: 14 MG/DL (ref 6–23)
CALCIUM SERPL-MCNC: 10.3 MG/DL (ref 8.8–10.2)
CHLORIDE SERPL-SCNC: 99 MMOL/L (ref 98–107)
CO2 SERPL-SCNC: 28 MMOL/L (ref 20–28)
CREAT SERPL-MCNC: 0.84 MG/DL (ref 0.6–1.1)
GLOBULIN SER CALC-MCNC: 2.6 G/DL (ref 2.3–3.5)
GLUCOSE SERPL-MCNC: 87 MG/DL (ref 70–99)
POTASSIUM SERPL-SCNC: 4.4 MMOL/L (ref 3.5–5.1)
PROT SERPL-MCNC: 7.2 G/DL (ref 6.3–8.2)
SODIUM SERPL-SCNC: 138 MMOL/L (ref 136–145)
T4 FREE SERPL-MCNC: 1 NG/DL (ref 0.9–1.7)
TSH, 3RD GENERATION: 1.47 UIU/ML (ref 0.27–4.2)

## 2024-06-03 PROCEDURE — 99214 OFFICE O/P EST MOD 30 MIN: CPT | Performed by: INTERNAL MEDICINE

## 2024-06-03 RX ORDER — LEVOTHYROXINE SODIUM 0.07 MG/1
75 TABLET ORAL
Qty: 77 TABLET | Refills: 3
Start: 2024-06-03 | End: 2024-06-04 | Stop reason: SDUPTHER

## 2024-06-03 RX ORDER — ESTRADIOL 0.1 MG/G
CREAM VAGINAL
COMMUNITY
Start: 2024-03-15

## 2024-06-03 ASSESSMENT — ENCOUNTER SYMPTOMS
DIARRHEA: 0
SHORTNESS OF BREATH: 1
CONSTIPATION: 1

## 2024-06-03 NOTE — PROGRESS NOTES
EVY Berumen MD, [facility] ENDOCRINOLOGY   AND   THYROID NODULE CLINIC            Reason for visit: Follow-up of hypothyroidism      ASSESSMENT AND PLAN:    1. Primary hypothyroidism  She will check thyroid function today.  - TSH; Future  - T4, Free; Future  - levothyroxine (SYNTHROID) 75 MCG tablet; 1 tablet daily 6 days a week (skip one day per week)  Dispense: 77 tablet; Refill: 3    2. Vitamin D deficiency  She will check vitamin D today.  - Vitamin D 25 Hydroxy; Future    3. History of primary hyperparathyroidism  She will check calcium today.  - Comprehensive Metabolic Panel; Future        Follow-up and Dispositions    Return in about 1 year (around 6/3/2025).           History of Present Illness:    Fanny Lara presents to the office for follow up of primary hypothyroidism and primary hyperparathyroidism (status post parathyroidectomy in 2014).      HYPOTHYROIDISM  Presentation of hypothyroidism: She was diagnosed with hypothyroidism in 2009. She was previously under the care of Dr. De La Vega (endocrinologist) in [place].     Treatment status: She was started on Synthroid at the time of diagnosis. She was changed to [place] Thyroid ~2010. Her dose was decreased from 90 mg daily to 60 mg daily early 2012. Her dose was decreased further to 60 mg daily six days per week 8/30/2012. It was increased back to 60 mg every day 11/30/2012. I changed her to Synthroid 100 mcg daily 5/24/2013. On 8/10/2013, her insurance company required her to change to generic levothyroxine 100 mcg. Her dose was changed to 100 mcg daily 6 days per week 8/27/2013, to 75 mcg daily 10/15/2013, to 75 mcg daily six days per week 10/21/2014, and to 75 mcg daily seven days per week 2/24/2017.   -75 mcg daily 6 days/week and 37.5 mcg daily 1 day/week 6/5/2019  -75 mcg daily ~July 2019 (change made on her own)  -75 mcg daily 6 days/week 11/18/2022    Symptoms: See review of systems below.     Menses: LMP was

## 2024-06-04 ENCOUNTER — PATIENT MESSAGE (OUTPATIENT)
Dept: ENDOCRINOLOGY | Age: 59
End: 2024-06-04

## 2024-06-04 DIAGNOSIS — E03.9 PRIMARY HYPOTHYROIDISM: ICD-10-CM

## 2024-06-04 RX ORDER — LEVOTHYROXINE SODIUM 0.07 MG/1
75 TABLET ORAL
Qty: 77 TABLET | Refills: 3 | Status: SHIPPED | OUTPATIENT
Start: 2024-06-04

## 2024-06-04 NOTE — TELEPHONE ENCOUNTER
From: Dr. Cristóbal Berumen  To: Fanny Lara  Sent: 6/4/2024 2:37 PM EDT  Subject: Lab review    Thank you for checking labs. Your thyroid levels look great. I renewed your levothyroxine prescription. You did have a mildly elevated calcium, however. If you would, please return to the lab at your convenience and recheck a metabolic panel along with a PTH/parathyroid hormone level. I submitted that order electronically. If you need me to send you the order on My Chart so you can check the labs elsewhere, let me know. Thanks.

## 2024-06-06 LAB
ALBUMIN SERPL-MCNC: 4.7 G/DL (ref 3.8–4.9)
ALBUMIN/GLOB SERPL: 2.5 {RATIO} (ref 1.2–2.2)
ALP SERPL-CCNC: 69 IU/L (ref 44–121)
ALT SERPL-CCNC: 26 IU/L (ref 0–32)
AST SERPL-CCNC: 21 IU/L (ref 0–40)
BILIRUB SERPL-MCNC: 0.8 MG/DL (ref 0–1.2)
BUN SERPL-MCNC: 17 MG/DL (ref 6–24)
BUN/CREAT SERPL: 19 (ref 9–23)
CALCIUM SERPL-MCNC: 9.6 MG/DL (ref 8.7–10.2)
CHLORIDE SERPL-SCNC: 100 MMOL/L (ref 96–106)
CO2 SERPL-SCNC: 26 MMOL/L (ref 20–29)
CREAT SERPL-MCNC: 0.91 MG/DL (ref 0.57–1)
EGFRCR SERPLBLD CKD-EPI 2021: 73 ML/MIN/1.73
GLOBULIN SER CALC-MCNC: 1.9 G/DL (ref 1.5–4.5)
GLUCOSE SERPL-MCNC: 82 MG/DL (ref 70–99)
POTASSIUM SERPL-SCNC: 4.5 MMOL/L (ref 3.5–5.2)
PROT SERPL-MCNC: 6.6 G/DL (ref 6–8.5)
PTH-INTACT SERPL-MCNC: 21 PG/ML (ref 15–65)
SODIUM SERPL-SCNC: 140 MMOL/L (ref 134–144)

## 2024-07-19 DIAGNOSIS — E03.9 PRIMARY HYPOTHYROIDISM: ICD-10-CM

## 2024-07-19 RX ORDER — LEVOTHYROXINE SODIUM 75 UG/1
TABLET ORAL
Qty: 77 TABLET | Refills: 3 | OUTPATIENT
Start: 2024-07-19

## 2024-11-14 ENCOUNTER — APPOINTMENT (RX ONLY)
Dept: URBAN - METROPOLITAN AREA CLINIC 329 | Facility: CLINIC | Age: 59
Setting detail: DERMATOLOGY
End: 2024-11-14

## 2024-11-14 DIAGNOSIS — L82.1 OTHER SEBORRHEIC KERATOSIS: ICD-10-CM

## 2024-11-14 DIAGNOSIS — D22 MELANOCYTIC NEVI: ICD-10-CM

## 2024-11-14 DIAGNOSIS — D18.0 HEMANGIOMA: ICD-10-CM

## 2024-11-14 DIAGNOSIS — L82.0 INFLAMED SEBORRHEIC KERATOSIS: ICD-10-CM

## 2024-11-14 DIAGNOSIS — L81.4 OTHER MELANIN HYPERPIGMENTATION: ICD-10-CM

## 2024-11-14 DIAGNOSIS — L85.3 XEROSIS CUTIS: ICD-10-CM

## 2024-11-14 PROBLEM — D22.5 MELANOCYTIC NEVI OF TRUNK: Status: ACTIVE | Noted: 2024-11-14

## 2024-11-14 PROBLEM — D18.01 HEMANGIOMA OF SKIN AND SUBCUTANEOUS TISSUE: Status: ACTIVE | Noted: 2024-11-14

## 2024-11-14 PROCEDURE — 17110 DESTRUCTION B9 LES UP TO 14: CPT

## 2024-11-14 PROCEDURE — 99213 OFFICE O/P EST LOW 20 MIN: CPT | Mod: 25

## 2024-11-14 PROCEDURE — ? LIQUID NITROGEN

## 2024-11-14 PROCEDURE — ? SUNSCREEN RECOMMENDATIONS

## 2024-11-14 PROCEDURE — ? COUNSELING

## 2024-11-14 ASSESSMENT — LOCATION DETAILED DESCRIPTION DERM
LOCATION DETAILED: LEFT ANTERIOR SHOULDER
LOCATION DETAILED: RIGHT SUPERIOR UPPER BACK
LOCATION DETAILED: SUPERIOR THORACIC SPINE
LOCATION DETAILED: EPIGASTRIC SKIN
LOCATION DETAILED: INFERIOR THORACIC SPINE
LOCATION DETAILED: RIGHT SUPERIOR MEDIAL UPPER BACK
LOCATION DETAILED: LEFT CLAVICULAR SKIN
LOCATION DETAILED: LEFT CLAVICULAR NECK
LOCATION DETAILED: LEFT SUPERIOR UPPER BACK
LOCATION DETAILED: RIGHT MEDIAL BREAST 1-2:00 REGION

## 2024-11-14 ASSESSMENT — LOCATION SIMPLE DESCRIPTION DERM
LOCATION SIMPLE: RIGHT UPPER BACK
LOCATION SIMPLE: LEFT SHOULDER
LOCATION SIMPLE: LEFT CLAVICULAR SKIN
LOCATION SIMPLE: UPPER BACK
LOCATION SIMPLE: RIGHT BREAST
LOCATION SIMPLE: LEFT UPPER BACK
LOCATION SIMPLE: ABDOMEN
LOCATION SIMPLE: LEFT ANTERIOR NECK

## 2024-11-14 ASSESSMENT — LOCATION ZONE DERM
LOCATION ZONE: ARM
LOCATION ZONE: TRUNK
LOCATION ZONE: NECK

## 2025-03-29 DIAGNOSIS — E03.9 PRIMARY HYPOTHYROIDISM: ICD-10-CM

## 2025-04-02 RX ORDER — LEVOTHYROXINE SODIUM 75 UG/1
75 TABLET ORAL
Qty: 77 TABLET | Refills: 0 | OUTPATIENT
Start: 2025-04-02

## 2025-06-17 ENCOUNTER — OFFICE VISIT (OUTPATIENT)
Dept: ENDOCRINOLOGY | Age: 60
End: 2025-06-17
Payer: COMMERCIAL

## 2025-06-17 VITALS
SYSTOLIC BLOOD PRESSURE: 115 MMHG | HEART RATE: 81 BPM | DIASTOLIC BLOOD PRESSURE: 65 MMHG | OXYGEN SATURATION: 93 % | BODY MASS INDEX: 25.49 KG/M2 | WEIGHT: 135 LBS | HEIGHT: 61 IN

## 2025-06-17 DIAGNOSIS — E03.9 PRIMARY HYPOTHYROIDISM: ICD-10-CM

## 2025-06-17 DIAGNOSIS — Z86.39 HISTORY OF PRIMARY HYPERPARATHYROIDISM: ICD-10-CM

## 2025-06-17 DIAGNOSIS — E03.9 PRIMARY HYPOTHYROIDISM: Primary | ICD-10-CM

## 2025-06-17 DIAGNOSIS — E55.9 VITAMIN D DEFICIENCY: ICD-10-CM

## 2025-06-17 DIAGNOSIS — E78.00 HYPERCHOLESTEROLEMIA: ICD-10-CM

## 2025-06-17 LAB
25(OH)D3 SERPL-MCNC: 55.5 NG/ML (ref 30–100)
ALBUMIN SERPL-MCNC: 4.2 G/DL (ref 3.5–5)
ALBUMIN/GLOB SERPL: 1.3 (ref 1–1.9)
ALP SERPL-CCNC: 86 U/L (ref 35–104)
ALT SERPL-CCNC: 36 U/L (ref 8–45)
ANION GAP SERPL CALC-SCNC: 15 MMOL/L (ref 7–16)
AST SERPL-CCNC: 27 U/L (ref 15–37)
BILIRUB SERPL-MCNC: 0.4 MG/DL (ref 0–1.2)
BUN SERPL-MCNC: 20 MG/DL (ref 6–23)
CALCIUM SERPL-MCNC: 9.4 MG/DL (ref 8.8–10.2)
CHLORIDE SERPL-SCNC: 101 MMOL/L (ref 98–107)
CHOLEST SERPL-MCNC: 191 MG/DL (ref 0–200)
CO2 SERPL-SCNC: 23 MMOL/L (ref 20–29)
CREAT SERPL-MCNC: 0.84 MG/DL (ref 0.6–1.1)
GLOBULIN SER CALC-MCNC: 3.1 G/DL (ref 2.3–3.5)
GLUCOSE SERPL-MCNC: 99 MG/DL (ref 70–99)
HDLC SERPL-MCNC: 54 MG/DL (ref 40–60)
HDLC SERPL: 3.5 (ref 0–5)
LDLC SERPL CALC-MCNC: 112 MG/DL (ref 0–100)
POTASSIUM SERPL-SCNC: 4.1 MMOL/L (ref 3.5–5.1)
PROT SERPL-MCNC: 7.2 G/DL (ref 6.3–8.2)
SODIUM SERPL-SCNC: 138 MMOL/L (ref 136–145)
T4 FREE SERPL-MCNC: 0.9 NG/DL (ref 0.9–1.7)
TRIGL SERPL-MCNC: 127 MG/DL (ref 0–150)
TSH, 3RD GENERATION: 2.3 UIU/ML (ref 0.27–4.2)
VLDLC SERPL CALC-MCNC: 25 MG/DL (ref 6–23)

## 2025-06-17 PROCEDURE — 99214 OFFICE O/P EST MOD 30 MIN: CPT | Performed by: INTERNAL MEDICINE

## 2025-06-17 RX ORDER — LEVOTHYROXINE SODIUM 75 UG/1
75 TABLET ORAL
Qty: 77 TABLET | Refills: 3 | Status: SHIPPED | OUTPATIENT
Start: 2025-06-17 | End: 2025-06-17 | Stop reason: SDUPTHER

## 2025-06-17 RX ORDER — LEVOTHYROXINE SODIUM 75 UG/1
75 TABLET ORAL
Qty: 77 TABLET | Refills: 3 | Status: SHIPPED | OUTPATIENT
Start: 2025-06-17 | End: 2025-06-18 | Stop reason: SDUPTHER

## 2025-06-17 ASSESSMENT — ENCOUNTER SYMPTOMS
DIARRHEA: 0
CONSTIPATION: 1

## 2025-06-17 NOTE — PROGRESS NOTES
EVY Berumen MD, [facility] ENDOCRINOLOGY   AND   THYROID NODULE CLINIC            Reason for visit: Follow-up of hypothyroidism      ASSESSMENT AND PLAN:    1. Primary hypothyroidism  She will check thyroid function today.  - TSH; Future  - T4, Free; Future  - levothyroxine (SYNTHROID) 75 MCG tablet; 1 tablet daily 6 days a week (skip one day per week)  Dispense: 77 tablet; Refill: 3    2. Vitamin D deficiency  She will check vitamin D today.  - Vitamin D 25 Hydroxy; Future    3. History of primary hyperparathyroidism  She will check calcium today.  - Comprehensive Metabolic Panel; Future        Follow-up and Dispositions    Return in about 1 year (around 6/17/2026).             History of Present Illness:    Fanny Lara presents to the office for follow up of primary hypothyroidism and primary hyperparathyroidism (status post parathyroidectomy in 2014).      HYPOTHYROIDISM  Presentation of hypothyroidism: She was diagnosed with hypothyroidism in 2009. She was previously under the care of Dr. De La Vega (endocrinologist) in [place].     Treatment status: She was started on Synthroid at the time of diagnosis. She was changed to [place] Thyroid ~2010. Her dose was decreased from 90 mg daily to 60 mg daily early 2012. Her dose was decreased further to 60 mg daily six days per week 8/30/2012. It was increased back to 60 mg every day 11/30/2012. I changed her to Synthroid 100 mcg daily 5/24/2013. On 8/10/2013, her insurance company required her to change to generic levothyroxine 100 mcg. Her dose was changed to 100 mcg daily 6 days per week 8/27/2013, to 75 mcg daily 10/15/2013, to 75 mcg daily six days per week 10/21/2014, and to 75 mcg daily seven days per week 2/24/2017.   -75 mcg daily 6 days/week and 37.5 mcg daily 1 day/week 6/5/2019  -75 mcg daily ~July 2019 (change made on her own)  -75 mcg daily 6 days/week 11/18/2022    Symptoms: See review of systems below.     Menses: LMP was

## 2025-06-18 DIAGNOSIS — E03.9 PRIMARY HYPOTHYROIDISM: ICD-10-CM

## 2025-06-18 RX ORDER — LEVOTHYROXINE SODIUM 75 UG/1
75 TABLET ORAL
Qty: 77 TABLET | Refills: 3 | Status: SHIPPED | OUTPATIENT
Start: 2025-06-18 | End: 2025-06-20 | Stop reason: SDUPTHER

## 2025-06-19 ENCOUNTER — PATIENT MESSAGE (OUTPATIENT)
Dept: ENDOCRINOLOGY | Age: 60
End: 2025-06-19

## 2025-06-19 DIAGNOSIS — E03.9 PRIMARY HYPOTHYROIDISM: ICD-10-CM

## 2025-06-20 RX ORDER — LEVOTHYROXINE SODIUM 75 UG/1
75 TABLET ORAL
Qty: 77 TABLET | Refills: 3 | Status: SHIPPED | OUTPATIENT
Start: 2025-06-20